# Patient Record
Sex: MALE | Race: WHITE
[De-identification: names, ages, dates, MRNs, and addresses within clinical notes are randomized per-mention and may not be internally consistent; named-entity substitution may affect disease eponyms.]

---

## 2019-06-27 ENCOUNTER — HOSPITAL ENCOUNTER (INPATIENT)
Dept: HOSPITAL 88 - ER | Age: 84
LOS: 2 days | Discharge: HOME | DRG: 872 | End: 2019-06-29
Attending: INTERNAL MEDICINE | Admitting: INTERNAL MEDICINE
Payer: MEDICARE

## 2019-06-27 VITALS — DIASTOLIC BLOOD PRESSURE: 87 MMHG | SYSTOLIC BLOOD PRESSURE: 120 MMHG

## 2019-06-27 VITALS — SYSTOLIC BLOOD PRESSURE: 124 MMHG | DIASTOLIC BLOOD PRESSURE: 72 MMHG

## 2019-06-27 VITALS — SYSTOLIC BLOOD PRESSURE: 120 MMHG | DIASTOLIC BLOOD PRESSURE: 87 MMHG

## 2019-06-27 VITALS — DIASTOLIC BLOOD PRESSURE: 72 MMHG | SYSTOLIC BLOOD PRESSURE: 124 MMHG

## 2019-06-27 VITALS — WEIGHT: 175 LBS | BODY MASS INDEX: 21.76 KG/M2 | HEIGHT: 75 IN

## 2019-06-27 VITALS — SYSTOLIC BLOOD PRESSURE: 121 MMHG | DIASTOLIC BLOOD PRESSURE: 62 MMHG

## 2019-06-27 DIAGNOSIS — Z87.891: ICD-10-CM

## 2019-06-27 DIAGNOSIS — N43.3: ICD-10-CM

## 2019-06-27 DIAGNOSIS — R53.1: ICD-10-CM

## 2019-06-27 DIAGNOSIS — Z66: ICD-10-CM

## 2019-06-27 DIAGNOSIS — R19.7: ICD-10-CM

## 2019-06-27 DIAGNOSIS — F03.90: ICD-10-CM

## 2019-06-27 DIAGNOSIS — N39.0: ICD-10-CM

## 2019-06-27 DIAGNOSIS — A41.9: Primary | ICD-10-CM

## 2019-06-27 DIAGNOSIS — E86.0: ICD-10-CM

## 2019-06-27 DIAGNOSIS — R65.20: ICD-10-CM

## 2019-06-27 DIAGNOSIS — N17.9: ICD-10-CM

## 2019-06-27 DIAGNOSIS — C44.301: ICD-10-CM

## 2019-06-27 DIAGNOSIS — R33.9: ICD-10-CM

## 2019-06-27 LAB
ALBUMIN SERPL-MCNC: 4 G/DL (ref 3.5–5)
ALBUMIN/GLOB SERPL: 1.3 {RATIO} (ref 0.8–2)
ALP SERPL-CCNC: 78 IU/L (ref 40–150)
ALT SERPL-CCNC: 19 IU/L (ref 0–55)
ANION GAP SERPL CALC-SCNC: 18.6 MMOL/L (ref 8–16)
BASOPHILS # BLD AUTO: 0 10*3/UL (ref 0–0.1)
BASOPHILS NFR BLD AUTO: 0.2 % (ref 0–1)
BUN SERPL-MCNC: 34 MG/DL (ref 7–26)
BUN/CREAT SERPL: 15 (ref 6–25)
CALCIUM SERPL-MCNC: 9.7 MG/DL (ref 8.4–10.2)
CHLORIDE SERPL-SCNC: 103 MMOL/L (ref 98–107)
CK MB SERPL-MCNC: 0.9 NG/ML (ref 0–5)
CK SERPL-CCNC: 41 IU/L (ref 30–200)
CO2 SERPL-SCNC: 23 MMOL/L (ref 22–29)
DEPRECATED NEUTROPHILS # BLD AUTO: 20.3 10*3/UL (ref 2.1–6.9)
EGFRCR SERPLBLD CKD-EPI 2021: 28 ML/MIN (ref 60–?)
EOSINOPHIL # BLD AUTO: 0.1 10*3/UL (ref 0–0.4)
EOSINOPHIL NFR BLD AUTO: 0.3 % (ref 0–6)
ERYTHROCYTE [DISTWIDTH] IN CORD BLOOD: 13.2 % (ref 11.7–14.4)
GLOBULIN PLAS-MCNC: 3 G/DL (ref 2.3–3.5)
GLUCOSE SERPLBLD-MCNC: 202 MG/DL (ref 74–118)
HCT VFR BLD AUTO: 46.5 % (ref 38.2–49.6)
HGB BLD-MCNC: 16.5 G/DL (ref 14–18)
LIPASE SERPL-CCNC: 29 U/L (ref 8–78)
LYMPHOCYTES # BLD: 0.4 10*3/UL (ref 1–3.2)
LYMPHOCYTES NFR BLD AUTO: 1.7 % (ref 18–39.1)
MCH RBC QN AUTO: 30.8 PG (ref 28–32)
MCHC RBC AUTO-ENTMCNC: 35.5 G/DL (ref 31–35)
MCV RBC AUTO: 86.8 FL (ref 81–99)
MONOCYTES # BLD AUTO: 1 10*3/UL (ref 0.2–0.8)
MONOCYTES NFR BLD AUTO: 4.5 % (ref 4.4–11.3)
NEUTS SEG NFR BLD AUTO: 92.8 % (ref 38.7–80)
PLATELET # BLD AUTO: 185 X10E3/UL (ref 140–360)
POTASSIUM SERPL-SCNC: 3.6 MMOL/L (ref 3.5–5.1)
RBC # BLD AUTO: 5.36 X10E6/UL (ref 4.3–5.7)
SODIUM SERPL-SCNC: 141 MMOL/L (ref 136–145)

## 2019-06-27 PROCEDURE — 36415 COLL VENOUS BLD VENIPUNCTURE: CPT

## 2019-06-27 PROCEDURE — 82550 ASSAY OF CK (CPK): CPT

## 2019-06-27 PROCEDURE — 84484 ASSAY OF TROPONIN QUANT: CPT

## 2019-06-27 PROCEDURE — 80053 COMPREHEN METABOLIC PANEL: CPT

## 2019-06-27 PROCEDURE — 83690 ASSAY OF LIPASE: CPT

## 2019-06-27 PROCEDURE — 74176 CT ABD & PELVIS W/O CONTRAST: CPT

## 2019-06-27 PROCEDURE — 71045 X-RAY EXAM CHEST 1 VIEW: CPT

## 2019-06-27 PROCEDURE — 85007 BL SMEAR W/DIFF WBC COUNT: CPT

## 2019-06-27 PROCEDURE — 81001 URINALYSIS AUTO W/SCOPE: CPT

## 2019-06-27 PROCEDURE — 83735 ASSAY OF MAGNESIUM: CPT

## 2019-06-27 PROCEDURE — 82553 CREATINE MB FRACTION: CPT

## 2019-06-27 PROCEDURE — 85730 THROMBOPLASTIN TIME PARTIAL: CPT

## 2019-06-27 PROCEDURE — 99284 EMERGENCY DEPT VISIT MOD MDM: CPT

## 2019-06-27 PROCEDURE — 80048 BASIC METABOLIC PNL TOTAL CA: CPT

## 2019-06-27 PROCEDURE — 85025 COMPLETE CBC W/AUTO DIFF WBC: CPT

## 2019-06-27 PROCEDURE — 87493 C DIFF AMPLIFIED PROBE: CPT

## 2019-06-27 PROCEDURE — 85610 PROTHROMBIN TIME: CPT

## 2019-06-27 PROCEDURE — 87086 URINE CULTURE/COLONY COUNT: CPT

## 2019-06-27 PROCEDURE — 85027 COMPLETE CBC AUTOMATED: CPT

## 2019-06-27 RX ADMIN — VANCOMYCIN HYDROCHLORIDE SCH MG: 250 CAPSULE ORAL at 18:03

## 2019-06-27 RX ADMIN — HEPARIN SODIUM SCH UNIT: 5000 INJECTION INTRAVENOUS; SUBCUTANEOUS at 21:16

## 2019-06-27 RX ADMIN — DONEPEZIL HYDROCHLORIDE SCH MG: 5 TABLET, FILM COATED ORAL at 21:16

## 2019-06-27 RX ADMIN — DOXAZOSIN MESYLATE SCH MG: 2 TABLET ORAL at 21:16

## 2019-06-27 RX ADMIN — VANCOMYCIN HYDROCHLORIDE SCH MG: 250 CAPSULE ORAL at 16:27

## 2019-06-27 RX ADMIN — SODIUM CHLORIDE SCH MLS/HR: 9 INJECTION, SOLUTION INTRAVENOUS at 13:06

## 2019-06-27 RX ADMIN — Medication SCH MG: at 21:17

## 2019-06-27 RX ADMIN — SODIUM CHLORIDE SCH MLS/HR: 9 INJECTION, SOLUTION INTRAVENOUS at 23:05

## 2019-06-27 NOTE — NUR
Pt voided about 200ml on his own at this time. Notified Dr. Stuart and received orders to hold 
of on the irving catheter at this time.

## 2019-06-27 NOTE — DIAGNOSTIC IMAGING REPORT
EXAM: CT ABDOMEN AND PELVIS without IV CONTRAST

DATE: 6/27/2019  Time stamp on Exam: 3:16 PM

INDICATION:  Colitis

COMPARISON:  None 

TECHNIQUE: The abdomen and pelvis were scanned using a multidetector helical

scanner. Coronal and sagittal reformations were obtained. Routine protocol

performed.



Technique modification was accomplished maintain the lowest dose possible to

the patient.



IV Contrast: None

Oral Contrast: Oral positive contrast intermixed with water

Radiation Dose: Total .60 mGy*cm

Estimated effective dose: DLP x 0.015 x size factor



FINDINGS:

LOWER THORAX: Basilar opacities right greater than left compatible with

pneumonia; likely aspiration. Coronary artery calcification.



LIVER: No masses

BILIARY: The gallbladder is unremarkable.  No ductal dilatation.



SPLEEN: No masses

PANCREAS: No masses



ADRENALS: No nodules

KIDNEYS: No hydronephrosis with bilateral small kidneys and the right side

measuring 7.7 cm and the left side measuring 9.1 cm.



GI TRACT: No distention, wall thickening or evidence of obstruction. The

appendix is normal.



VESSELS: Atherosclerotic vascular calcification.

PERITONEUM/RETROPERITONEUM: No free air or fluid

LYMPH NODES: No lymphadenopathy



REPRODUCTIVE ORGANS: Unremarkable

BLADDER: Unremarkable



SOFT TISSUES: There are large bilateral hydroceles.

BONES: No suspicious bone lesions. Degenerative changes of the spine.



IMPRESSION:



1. Bibasilar airspace opacities compatible with pneumonia; likely aspiration

2. Small bilateral kidneys.

3. No evidence to suggest colitis or bowel wall thickening.

4. Large bilateral hydroceles.



Signed by: Dr. Jignesh Harris DO on 6/27/2019 3:44 PM

## 2019-06-27 NOTE — DIAGNOSTIC IMAGING REPORT
EXAM: CHEST SINGLE (PORTABLE), AP Portable

DATE: 6/27/2019  Time stamp on exam: 10:36 AM

INDICATION: Abdominal pain with diarrhea and vomiting

COMPARISON: None



FINDINGS:

LINES/TUBES: None



LUNGS: Bibasilar airspace opacities compatible with atelectasis and/or

infiltrate. 



PLEURA: No effusions or pneumothorax.



HEART AND MEDIASTINUM: Normal size and contour.



BONES AND SOFT TISSUES: No acute findings. There is a rudimentary left cervical

rib. 



IMPRESSION:

Bibasilar airspace opacities.



Signed by: Dr. Jignesh Harris DO on 6/27/2019 11:13 AM

## 2019-06-27 NOTE — NUR
Pt wife concerned about pt not getting home meds for tonight. Called and spoke with Dr. Stuart 
and MD [place] for pt to restart home meds.

## 2019-06-27 NOTE — NUR
Beside report walking rounds complete. Pt resting in bed and in no apparent distress. Pt 
wife at bedside. Pt room air and tele. All safety measures ensured.

## 2019-06-27 NOTE — NUR
Pt received to floor at this time. Pt is a0x2-3 and able to verbalize needs. Pt is 
pleasantly confused and follows directions easily. Ambulatory with assist. Condom cath in 
place with no urine noted at this time. Girlfriend is at bedside at this time. Pt denies any 
pain. Dressing to face is dry and intact.

## 2019-06-27 NOTE — NUR
Spoke with Dr. Stuart at this time to report that pt had not voided and bladder scan revealed 
that he had 247ml. Received orders to try to insert irving catheter and if unsuccessful to 
consult Dr. MINE Solis. CT scan result given to Dr. Stuart at this time aswell  and no new 
orders fro that at this time. Home medications not renewed at this time by Dr. Stuart.

## 2019-06-27 NOTE — XMS REPORT
Patient Summary Document

                             Created on: 2019



ALBERTO RILEY

External Reference #: [id number]

: 1935

Sex: Male



Demographics







                          Address                   [address] [place] [address]

 

                          Home Phone                [phone]

 

                          Preferred Language        Unknown

 

                          Marital Status            Unknown

 

                          Amish Affiliation     Unknown

 

                          Race                      Unknown

 

                          Ethnic Group              Unknown





Author







                          Author                    [facility]

 

                          Address                   Unknown

 

                          Phone                     Unavailable







Care Team Providers







                    Care Team Member Name    Role                Phone

 

                    DANIEL PIZANO    Unavailable         Unavailable







Problems

This patient has no known problems.



Allergies, Adverse Reactions, Alerts

This patient has no known allergies or adverse reactions.



Medications

This patient has no known medications.



Results







           Test Description    Test Time    Test Comments    Text Results    Atomic Results    Result

 Comments

 

                CHEST SINGLE (PORTABLE)    2019 11:10:00                                                   

                                                       [place] [address]      Patient Name: ALBERTO RILEY                        
          MR #: [id number]                     : 1935                  
                Age/Sex: 83/M  Acct #: [id number]                             
Req #: [id number]  Adm Physician:                                               
      Ordered by: EMMANUEL PIZANO MD                            Report #: [id number]
[id number]        Location: ER                                      Room/Bed:         
           
___________________________________________________________________________________________________
   Procedure: [id number] DX/CHEST SINGLE (PORTABLE)  Exam Date: 19         
                  Exam Time: 1025                                              
REPORT STATUS: Signed    EXAM: CHEST SINGLE (PORTABLE), AP Portable   DATE: 
2019  Time stamp on exam: 10:36 AM   INDICATION: Abdominal pain with 
diarrhea and vomiting   COMPARISON: None      FINDINGS:   LINES/TUBES: None     
LUNGS: Bibasilar airspace opacities compatible with atelectasis and/or   
infiltrate.       PLEURA: No effusions or pneumothorax.      HEART AND 
MEDIASTINUM: Normal size and contour.      BONES AND SOFT TISSUES: No acute 
findings. There is a rudimentary left cervical   rib.       IMPRESSION:   
Bibasilar airspace opacities.      Signed by: Dr. Cami Bobby DO on 2019
11:13 AM        Dictated By: CAMI BOBBY DO  Electronically Signed By: CAMI BOBBY DO on 19 1113  Transcribed By: JACOB on 19 1113       COPY
TO:   EMMANUEL PIZANO MD

## 2019-06-28 VITALS — DIASTOLIC BLOOD PRESSURE: 73 MMHG | SYSTOLIC BLOOD PRESSURE: 143 MMHG

## 2019-06-28 VITALS — SYSTOLIC BLOOD PRESSURE: 142 MMHG | DIASTOLIC BLOOD PRESSURE: 68 MMHG

## 2019-06-28 VITALS — SYSTOLIC BLOOD PRESSURE: 142 MMHG | DIASTOLIC BLOOD PRESSURE: 67 MMHG

## 2019-06-28 VITALS — DIASTOLIC BLOOD PRESSURE: 61 MMHG | SYSTOLIC BLOOD PRESSURE: 137 MMHG

## 2019-06-28 VITALS — DIASTOLIC BLOOD PRESSURE: 78 MMHG | SYSTOLIC BLOOD PRESSURE: 151 MMHG

## 2019-06-28 VITALS — SYSTOLIC BLOOD PRESSURE: 150 MMHG | DIASTOLIC BLOOD PRESSURE: 78 MMHG

## 2019-06-28 LAB
ALBUMIN SERPL-MCNC: 3 G/DL (ref 3.5–5)
ALBUMIN/GLOB SERPL: 1.3 {RATIO} (ref 0.8–2)
ALP SERPL-CCNC: 56 IU/L (ref 40–150)
ALT SERPL-CCNC: 14 IU/L (ref 0–55)
ANION GAP SERPL CALC-SCNC: 10.1 MMOL/L (ref 8–16)
BACTERIA URNS QL MICRO: (no result) /HPF
BASOPHILS # BLD AUTO: 0 10*3/UL (ref 0–0.1)
BASOPHILS NFR BLD AUTO: 0.3 % (ref 0–1)
BILIRUB UR QL: NEGATIVE
BUN SERPL-MCNC: 32 MG/DL (ref 7–26)
BUN/CREAT SERPL: 21 (ref 6–25)
CALCIUM SERPL-MCNC: 8.2 MG/DL (ref 8.4–10.2)
CHLORIDE SERPL-SCNC: 112 MMOL/L (ref 98–107)
CLARITY UR: (no result)
CO2 SERPL-SCNC: 24 MMOL/L (ref 22–29)
COARSE GRAN CASTS URNS QL MICRO: (no result)
COLOR UR: YELLOW
DEPRECATED APTT PLAS QN: 35.4 SECONDS (ref 23.8–35.5)
DEPRECATED INR PLAS: 1.06
DEPRECATED NEUTROPHILS # BLD AUTO: 8.2 10*3/UL (ref 2.1–6.9)
DEPRECATED RBC URNS MANUAL-ACNC: (no result) /HPF (ref 0–5)
EGFRCR SERPLBLD CKD-EPI 2021: 44 ML/MIN (ref 60–?)
EOSINOPHIL # BLD AUTO: 0.3 10*3/UL (ref 0–0.4)
EOSINOPHIL NFR BLD AUTO: 2.6 % (ref 0–6)
EPI CELLS URNS QL MICRO: (no result) /LPF
ERYTHROCYTE [DISTWIDTH] IN CORD BLOOD: 13.5 % (ref 11.7–14.4)
FINE GRAN CASTS #/AREA URNS LPF: (no result) /[LPF]
GLOBULIN PLAS-MCNC: 2.4 G/DL (ref 2.3–3.5)
GLUCOSE SERPLBLD-MCNC: 107 MG/DL (ref 74–118)
HCT VFR BLD AUTO: 37.2 % (ref 38.2–49.6)
HGB BLD-MCNC: 13.1 G/DL (ref 14–18)
HYALINE CASTS #/AREA URNS LPF: (no result) /[LPF] (ref 0–1)
KETONES UR QL STRIP.AUTO: NEGATIVE
LEUKOCYTE ESTERASE UR QL STRIP.AUTO: NEGATIVE
LYMPHOCYTES # BLD: 1.6 10*3/UL (ref 1–3.2)
LYMPHOCYTES NFR BLD AUTO: 14.2 % (ref 18–39.1)
MCH RBC QN AUTO: 31.2 PG (ref 28–32)
MCHC RBC AUTO-ENTMCNC: 35.2 G/DL (ref 31–35)
MCV RBC AUTO: 88.6 FL (ref 81–99)
MONOCYTES # BLD AUTO: 1 10*3/UL (ref 0.2–0.8)
MONOCYTES NFR BLD AUTO: 8.8 % (ref 4.4–11.3)
MUCOUS THREADS URNS QL MICRO: (no result)
NEUTS SEG NFR BLD AUTO: 73.6 % (ref 38.7–80)
NITRITE UR QL STRIP.AUTO: NEGATIVE
PLATELET # BLD AUTO: 139 X10E3/UL (ref 140–360)
POTASSIUM SERPL-SCNC: 3.1 MMOL/L (ref 3.5–5.1)
PROT UR QL STRIP.AUTO: NEGATIVE
PROTHROMBIN TIME: 14.3 SECONDS (ref 11.9–14.5)
RBC # BLD AUTO: 4.2 X10E6/UL (ref 4.3–5.7)
SODIUM SERPL-SCNC: 143 MMOL/L (ref 136–145)
SP GR UR STRIP: 1.02 (ref 1.01–1.02)
UROBILINOGEN UR STRIP-MCNC: 0.2 MG/DL (ref 0.2–1)
WAXY CASTS URNS QL MICRO: (no result)
WBC #/AREA URNS HPF: (no result) /HPF (ref 0–5)

## 2019-06-28 RX ADMIN — Medication SCH MG: at 08:23

## 2019-06-28 RX ADMIN — HEPARIN SODIUM SCH UNIT: 5000 INJECTION INTRAVENOUS; SUBCUTANEOUS at 08:23

## 2019-06-28 RX ADMIN — Medication SCH MG: at 20:47

## 2019-06-28 RX ADMIN — VANCOMYCIN HYDROCHLORIDE SCH MG: 250 CAPSULE ORAL at 13:21

## 2019-06-28 RX ADMIN — DOXAZOSIN MESYLATE SCH MG: 2 TABLET ORAL at 08:23

## 2019-06-28 RX ADMIN — VANCOMYCIN HYDROCHLORIDE SCH MG: 250 CAPSULE ORAL at 06:16

## 2019-06-28 RX ADMIN — DONEPEZIL HYDROCHLORIDE SCH MG: 5 TABLET, FILM COATED ORAL at 20:47

## 2019-06-28 RX ADMIN — VANCOMYCIN HYDROCHLORIDE SCH MG: 250 CAPSULE ORAL at 01:07

## 2019-06-28 RX ADMIN — CEFTRIAXONE SCH MLS/HR: 100 INJECTION, POWDER, FOR SOLUTION INTRAVENOUS at 14:25

## 2019-06-28 RX ADMIN — SODIUM CHLORIDE SCH MLS/HR: 9 INJECTION, SOLUTION INTRAVENOUS at 06:41

## 2019-06-28 RX ADMIN — HEPARIN SODIUM SCH UNIT: 5000 INJECTION INTRAVENOUS; SUBCUTANEOUS at 20:47

## 2019-06-28 NOTE — NUR
Patient received sitting up in bed. AAO x 3. Family at bedside. Patient had no c/o pain. No 
signs of respiratory distress. Dressing to nose CDI. Telemetry in place. Fall precautions 
implemented. Patient instructed to call for assistance when needed. Call light within reach.

## 2019-06-28 NOTE — DIAGNOSTIC IMAGING REPORT
Examination: Single AP view of the chest.



COMPARISON: None.



INDICATION: sepsis

     

DISCUSSION:



Lines/tubes:  None.



Lungs:  The lungs are well inflated and clear. No pneumonia or pulmonary edema.



Pleura:  No  pleural effusion or pneumothorax.



Heart and mediastinum:  The heart and the mediastinum are unremarkable.



Bones and soft tissues:  No acute bony abnormalities.  



IMPRESSION:

 

1.   No acute cardiopulmonary abnormalities.





Signed by: Dr. Andrew Palisch, M.D. on 6/28/2019 5:39 AM

## 2019-06-28 NOTE — NUR
PATIENT IS IN STABLE CONDITION WITH NO S/S RESPIRATORY DISTRESS. NO PAIN VOICED. IV SALINE 
LOCKED. TELEMETRY APPLIED. FAMILY MEMBERS PRESENT IN ROOM. BED ALARM ON. CALL LIGHT IS 
WITHIN REACH, PATIENT INSTRUCTED TO CALL FOR ASSISTANCE AS NEEDED. BEDSIDE REPORT COMPLETED 
WITH ONCOMING NURSE.

## 2019-06-29 VITALS — DIASTOLIC BLOOD PRESSURE: 63 MMHG | SYSTOLIC BLOOD PRESSURE: 142 MMHG

## 2019-06-29 VITALS — SYSTOLIC BLOOD PRESSURE: 134 MMHG | DIASTOLIC BLOOD PRESSURE: 63 MMHG

## 2019-06-29 VITALS — SYSTOLIC BLOOD PRESSURE: 142 MMHG | DIASTOLIC BLOOD PRESSURE: 75 MMHG

## 2019-06-29 VITALS — DIASTOLIC BLOOD PRESSURE: 74 MMHG | SYSTOLIC BLOOD PRESSURE: 161 MMHG

## 2019-06-29 VITALS — DIASTOLIC BLOOD PRESSURE: 69 MMHG | SYSTOLIC BLOOD PRESSURE: 151 MMHG

## 2019-06-29 VITALS — DIASTOLIC BLOOD PRESSURE: 75 MMHG | SYSTOLIC BLOOD PRESSURE: 142 MMHG

## 2019-06-29 LAB
ANION GAP SERPL CALC-SCNC: 11.3 MMOL/L (ref 8–16)
BUN SERPL-MCNC: 21 MG/DL (ref 7–26)
BUN/CREAT SERPL: 16 (ref 6–25)
CALCIUM SERPL-MCNC: 9 MG/DL (ref 8.4–10.2)
CHLORIDE SERPL-SCNC: 110 MMOL/L (ref 98–107)
CO2 SERPL-SCNC: 26 MMOL/L (ref 22–29)
EGFRCR SERPLBLD CKD-EPI 2021: 54 ML/MIN (ref 60–?)
EOSINOPHIL NFR BLD MANUAL: 5 % (ref 0–7)
ERYTHROCYTE [DISTWIDTH] IN CORD BLOOD: 13.1 % (ref 11.7–14.4)
GLUCOSE SERPLBLD-MCNC: 113 MG/DL (ref 74–118)
HCT VFR BLD AUTO: 39.5 % (ref 38.2–49.6)
HGB BLD-MCNC: 13.4 G/DL (ref 14–18)
LYMPHOCYTES NFR BLD MANUAL: 30 % (ref 19–48)
MAGNESIUM SERPL-MCNC: 1.6 MG/DL (ref 1.3–2.1)
MCH RBC QN AUTO: 30.7 PG (ref 28–32)
MCHC RBC AUTO-ENTMCNC: 33.9 G/DL (ref 31–35)
MCV RBC AUTO: 90.4 FL (ref 81–99)
MONOCYTES NFR BLD MANUAL: 5 % (ref 3.4–9)
NEUTS SEG NFR BLD MANUAL: 60 % (ref 40–74)
PLATELET # BLD AUTO: 139 X10E3/UL (ref 140–360)
POTASSIUM SERPL-SCNC: 3.3 MMOL/L (ref 3.5–5.1)
RBC # BLD AUTO: 4.37 X10E6/UL (ref 4.3–5.7)
SODIUM SERPL-SCNC: 144 MMOL/L (ref 136–145)

## 2019-06-29 RX ADMIN — CEFTRIAXONE SCH MLS/HR: 100 INJECTION, POWDER, FOR SOLUTION INTRAVENOUS at 08:13

## 2019-06-29 RX ADMIN — HEPARIN SODIUM SCH UNIT: 5000 INJECTION INTRAVENOUS; SUBCUTANEOUS at 08:13

## 2019-06-29 RX ADMIN — DOXAZOSIN MESYLATE SCH MG: 2 TABLET ORAL at 08:14

## 2019-06-29 RX ADMIN — Medication SCH MG: at 08:14

## 2019-06-29 NOTE — DISCHARGE SUMMARY
PRIMARY CARE DOCTOR:  Dr. Maren Muro.

 

FINAL DIAGNOSIS:  Sepsis present on admission possibly due to urinary tract infection.

 

SECONDARY DIAGNOSES:  

1. Dementia.

2. Urinary retention.

3. Hydrocele.

 

CONSULTANTS:  None.

 

PROCEDURES/STUDIES PERFORMED:  CT.

 

HISTORY:  Per [facility] and [facility].

 

HOSPITAL COURSE:  The patient was admitted with sepsis.  The patient was afebrile,

however, he did have a white count of 22.  With hydration, his acute kidney injury

resolved.  Creatinine was 2.2, at the time of discharge is 1.3.  This is likely his

baseline.  The patient's diarrhea resolved.  Empirically, he did get oral vancomycin.

Clostridium difficile toxin was negative.  CTA of the abdomen and pelvis did not show

any colitis, however, it does show bilateral airspace opacities compatible with

pneumonia, however, clinically there is no evidence of pneumonia.  The patient received

IV Rocephin here at the hospital.  He will go home on Levaquin.  The patient does have a

hydrocele and also urinary retention, although still able to urinate.  Urinalysis did

show pyuria.  Urine culture so far is negative at 24 hours.  The patient was seen and

examined today.  It took 32 minutes total to discharge this patient including updating

his primary care doctor, whom he will follow up in a week. 

 

CONDITION ON DISCHARGE:  Improved.

 

DISCHARGE MEDICATIONS:  Please see medication reconciliation form.

 

 

 

 

______________________________

MD ABBI Bill/NASH

D:  06/29/2019 14:28:27

T:  06/29/2019 19:23:49

Job #:  [id number]

## 2019-06-29 NOTE — NUR
PATIENT IS AWAKE AND IN STABLE CONDITION WITH NO S/S RESPIRATORY DISTRESS. NO PAIN VOICED. 
FAMILY MEMBER PRESENT IN ROOM. BED ALARM ON. CALL LIGHT IS WITHIN REACH, PATIENT INSTRUCTED 
TO CALL FOR ASSISTANCE AS NEEDED.

## 2019-06-29 NOTE — NUR
PATIENT DISCHARGE HOME- PATIENT OFF THE UNIT AT 1734 PER WHEELCHAIR ACCOMPANIED BY PCT TO 
THE PATIENT'S PERSONAL VEHICLE. PATIENT IN STABLE CONDITION WITH NO S/S OF RESPIRATORY 
DISTRESS. NO PAIN VOICED. DRESSING TO NOSE AREA REMAINED DRY AND INTACT. IV REMOVED WITH TIP 
INTACT. DISCHARGE TEACHING, INSTRUCTIONS, AND MEDICATIONS GIVEN TO THE PATIENT. ALL PERSONAL 
ITEMS TAKEN WITH THE PATIENT AND HIS WIFE.